# Patient Record
Sex: MALE | Race: WHITE | Employment: UNEMPLOYED | URBAN - METROPOLITAN AREA
[De-identification: names, ages, dates, MRNs, and addresses within clinical notes are randomized per-mention and may not be internally consistent; named-entity substitution may affect disease eponyms.]

---

## 2019-12-24 ENCOUNTER — HOSPITAL ENCOUNTER (EMERGENCY)
Age: 19
Discharge: HOME | End: 2019-12-24
Attending: EMERGENCY MEDICINE
Payer: COMMERCIAL

## 2019-12-24 VITALS
DIASTOLIC BLOOD PRESSURE: 76 MMHG | TEMPERATURE: 98.8 F | HEIGHT: 64 IN | SYSTOLIC BLOOD PRESSURE: 136 MMHG | RESPIRATION RATE: 20 BRPM | OXYGEN SATURATION: 100 % | HEART RATE: 76 BPM

## 2019-12-24 DIAGNOSIS — Z72.89 SELF-MUTILATION: ICD-10-CM

## 2019-12-24 DIAGNOSIS — S51.812A LACERATION OF LEFT FOREARM, INITIAL ENCOUNTER: Primary | ICD-10-CM

## 2019-12-24 PROCEDURE — 77030018836 HC SOL IRR NACL ICUM -A

## 2019-12-24 PROCEDURE — 90471 IMMUNIZATION ADMIN: CPT

## 2019-12-24 PROCEDURE — 74011250636 HC RX REV CODE- 250/636: Performed by: PHYSICIAN ASSISTANT

## 2019-12-24 PROCEDURE — 75810000293 HC SIMP/SUPERF WND  RPR

## 2019-12-24 PROCEDURE — 99282 EMERGENCY DEPT VISIT SF MDM: CPT

## 2019-12-24 PROCEDURE — 77030002986 HC SUT PROL J&J -A

## 2019-12-24 PROCEDURE — 90715 TDAP VACCINE 7 YRS/> IM: CPT | Performed by: PHYSICIAN ASSISTANT

## 2019-12-24 RX ORDER — FLUOXETINE HYDROCHLORIDE 20 MG/1
60 CAPSULE ORAL DAILY
Status: ON HOLD | COMMUNITY
End: 2020-01-31 | Stop reason: SDUPTHER

## 2019-12-24 RX ORDER — METHYLPHENIDATE HYDROCHLORIDE 36 MG/1
36 TABLET ORAL
COMMUNITY
End: 2020-01-31

## 2019-12-24 RX ORDER — ARIPIPRAZOLE 15 MG/1
15 TABLET ORAL DAILY
COMMUNITY
End: 2020-01-31

## 2019-12-24 RX ADMIN — TETANUS TOXOID, REDUCED DIPHTHERIA TOXOID AND ACELLULAR PERTUSSIS VACCINE, ADSORBED 0.5 ML: 5; 2.5; 8; 8; 2.5 SUSPENSION INTRAMUSCULAR at 14:03

## 2019-12-24 NOTE — DISCHARGE INSTRUCTIONS
Stitches out in 7 to 10 days  Good wound care   Plan bacitracin or Neosporin to wound for the next 2 to 3 days  Watch for infection  Tylenol or ibuprofen for pain  Contact counselors and psychiatrist of injury     Cuts: Care Instructions  Your Care Instructions  A cut can happen anywhere on your body. Stitches, staples, skin adhesives, or pieces of tape called Steri-Strips are sometimes used to keep the edges of a cut together and help it heal. Steri-Strips can be used by themselves or with stitches or staples. Sometimes cuts are left open. If the cut went deep and through the skin, the doctor may have closed the cut in two layers. A deeper layer of stitches brings the deep part of the cut together. These stitches will dissolve and don't need to be removed. The upper layer closure, which could be stitches, staples, Steri-Strips, or adhesive, is what you see on the cut. A cut is often covered by a bandage. The doctor has checked you carefully, but problems can develop later. If you notice any problems or new symptoms, get medical treatment right away. Follow-up care is a key part of your treatment and safety. Be sure to make and go to all appointments, and call your doctor if you are having problems. It's also a good idea to know your test results and keep a list of the medicines you take. How can you care for yourself at home? If a cut is open or closed  · Prop up the sore area on a pillow anytime you sit or lie down during the next 3 days. Try to keep it above the level of your heart. This will help reduce swelling. · Keep the cut dry for the first 24 to 48 hours. After this, you can shower if your doctor okays it. Pat the cut dry. · Don't soak the cut, such as in a bathtub. Your doctor will tell you when it's safe to get the cut wet. · After the first 24 to 48 hours, clean the cut with soap and water 2 times a day unless your doctor gives you different instructions.   ? Don't use hydrogen peroxide or alcohol, which can slow healing. ? You may cover the cut with a thin layer of petroleum jelly and a nonstick bandage. ? If the doctor put a bandage over the cut, put on a new bandage after cleaning the cut or if the bandage gets wet or dirty. · Avoid any activity that could cause your cut to reopen. · Be safe with medicines. Read and follow all instructions on the label. ? If the doctor gave you a prescription medicine for pain, take it as prescribed. ? If you are not taking a prescription pain medicine, ask your doctor if you can take an over-the-counter medicine. If the cut is closed with stitches, staples, or Steri-Strips  · Follow the above instructions for open or closed cuts. · Do not remove the stitches or staples on your own. Your doctor will tell you when to come back to have the stitches or staples removed. · Leave Steri-Strips on until they fall off. If the cut is closed with a skin adhesive  · Follow the above instructions for open or closed cuts. · Leave the skin adhesive on your skin until it falls off on its own. This may take 5 to 10 days. · Do not scratch, rub, or pick at the adhesive. · Do not put the sticky part of a bandage directly on the adhesive. · Do not put any kind of ointment, cream, or lotion over the area. This can make the adhesive fall off too soon. Do not use hydrogen peroxide or alcohol, which can slow healing. When should you call for help? Call your doctor now or seek immediate medical care if:    · You have new pain, or your pain gets worse.     · The skin near the cut is cold or pale or changes color.     · You have tingling, weakness, or numbness near the cut.     · The cut starts to bleed, and blood soaks through the bandage. Oozing small amounts of blood is normal.     · You have trouble moving the area near the cut.     · You have symptoms of infection, such as:  ? Increased pain, swelling, warmth, or redness around the cut.  ?  Red streaks leading from the cut.  ? Pus draining from the cut.  ? A fever.    Watch closely for changes in your health, and be sure to contact your doctor if:    · The cut reopens.     · You do not get better as expected. Where can you learn more? Go to http://mary-loni.info/. Enter M735 in the search box to learn more about \"Cuts: Care Instructions. \"  Current as of: June 26, 2019  Content Version: 12.2  © 6772-5745 [employer]. Care instructions adapted under license by [employer] (which disclaims liability or warranty for this information). If you have questions about a medical condition or this instruction, always ask your healthcare professional. [address] any warranty or liability for your use of this information.

## 2019-12-24 NOTE — ED TRIAGE NOTES
Patient with complaints of a laceration to the left wrist. Per mom, patient scratched himself with objects when he is stressed.  Patient denies trying to hurt himself

## 2019-12-24 NOTE — ED PROVIDER NOTES
EMERGENCY DEPARTMENT HISTORY AND PHYSICAL EXAM    Date: 12/24/2019  Patient Name: Cecilia Lombardi    History of Presenting Illness       Chief Complaint   Patient presents with    Laceration       History Provided By: Patient and Patient's Mother    Additional History (Context): Cecilia Lombardi is a 20 y.o. male with a known history of traumatic brain injury, fetal alcohol syndrome, behavioral issues with known history of self-mutilation presents the emergency room with his mother with complaints of laceration to the back of his left forearm. Patient was \"scratching himself\" earlier today. However did not realize that he actually sustained a laceration. Finally was able to reveal this to his mother who immediately brought him to the hospital to be evaluated. Bleeding under controlled. He denies any pain. Mother states that he is actively under the care of psychiatry and counseling. She is not worried about his overall health as he does this from time to time when he is stressed out. PCP: Andreas Meyer MD    Current Outpatient Medications   Medication Sig Dispense Refill    FLUoxetine (PROZAC) 20 mg capsule Take 60 mg by mouth daily.  methylphenidate ER 36 mg 24 hr tab Take 36 mg by mouth every morning.  ARIPiprazole (ABILIFY) 15 mg tablet Take 15 mg by mouth daily. Past History     Past Medical History:  Past Medical History:   Diagnosis Date    Fetal alcohol syndrome     TBI (traumatic brain injury) ([facility] [address]        Past Surgical History:  Past Surgical History:   Procedure Laterality Date    HX HERNIA REPAIR         Family History:  History reviewed. No pertinent family history. Social History:  Social History     Tobacco Use    Smoking status: Never Smoker    Smokeless tobacco: Never Used   Substance Use Topics    Alcohol use: Not on file    Drug use: Not on file       Allergies:  No Known Allergies      Review of Systems   Review of Systems   Musculoskeletal: Negative. Skin: Positive for wound. All other systems reviewed and are negative. Physical Exam     Vitals:    12/24/19 1252   BP: 136/76   Pulse: 76   Resp: 20   Temp: 98.8 °F (37.1 °C)   SpO2: 100%   Height: 5' 4\" (1.626 m)     Physical Exam  Vitals signs and nursing note reviewed. Constitutional:       Appearance: Normal appearance. He is well-developed and well-groomed. Cardiovascular:      Rate and Rhythm: Normal rate and regular rhythm. Heart sounds: Normal heart sounds. Pulmonary:      Effort: Pulmonary effort is normal.      Breath sounds: Normal breath sounds. Musculoskeletal:      Left forearm: He exhibits laceration. He exhibits no tenderness and no swelling. Comments: Linear 2.5 cm laceration to the dorsal aspect of the left forearm down approaching the wrist.  Full-thickness laceration. Some bleeding but controlled by pressure. No soft tissue swelling. Wound extends into the subcutaneous layers. Able to visualize wound bed. There are also superficial abrasions noted to the dorsal and volar aspect of the forearm as well. Skin:     General: Skin is warm and dry. Capillary Refill: Capillary refill takes less than 2 seconds. Neurological:      Mental Status: He is alert and oriented to person, place, and time. Psychiatric:         Mood and Affect: Mood normal.         Behavior: Behavior normal. Behavior is cooperative. Nursing note and vitals reviewed         Diagnostic Study Results     Labs -   No results found for this or any previous visit (from the past 12 hour(s)). Radiologic Studies   No orders to display     CT Results  (Last 48 hours)    None        CXR Results  (Last 48 hours)    None            Medical Decision Making   I am the first provider for this patient. I reviewed the vital signs, available nursing notes, past medical history, past surgical history, family history and social history.     Vital Signs-Reviewed the patient's vital signs. Records Reviewed: Nursing Notes    Provider Notes:   20 y.o. male   See procedure note    Procedures:  Wound Closure by Adhesive  Date/Time: 12/24/2019 2:09 PM  Performed by: ELDA Faria  Authorized by: ELDA Faria     Consent:     Consent obtained:  Verbal    Consent given by:  Patient and parent    Risks discussed:  Infection and pain  Anesthesia (see MAR for exact dosages): Anesthesia method:  Local infiltration    Local anesthetic:  Lidocaine 1% w/o epi  Laceration details:     Location:  Shoulder/arm    Shoulder/arm location:  L lower arm    Length (cm):  2.5    Depth (mm):  3  Repair type:     Repair type:  Simple  Pre-procedure details:     Preparation:  Patient was prepped and draped in usual sterile fashion  Exploration:     Wound exploration: wound explored through full range of motion and entire depth of wound probed and visualized      Wound extent: no fascia violation noted, no muscle damage noted, no nerve damage noted and no tendon damage noted      Contaminated: no    Treatment:     Area cleansed with:  Betadine and saline    Amount of cleaning:  Standard    Irrigation solution:  Sterile saline    Irrigation volume:  50 cc    Irrigation method:  Syringe    Visualized foreign bodies/material removed: no    Skin repair:     Repair method:  Sutures    Suture size:  5-0    Suture material:  Prolene    Suture technique:  Vertical mattress    Number of sutures:  3  Approximation:     Approximation:  Close  Post-procedure details:     Dressing:  Antibiotic ointment, non-adherent dressing and sterile dressing    Patient tolerance of procedure: Tolerated well, no immediate complications      Procedure, long talk with patient and his mother again regarding his issues with self-mutilation. Again, mother is very competent about taking him home and talking to a psychiatrist as well as his therapist regarding this issue.   Family already looking around the house just to make sure there is nothing else that patient can get a hold of that may cause injury. Felt comfortable letting this patient go home with his parents. Also was able to meet his father prior to discharge. ED Course:   Initial assessment performed. The patients presenting problems have been discussed, and they are in agreement with the care plan formulated and outlined with them. I have encouraged them to ask questions as they arise throughout their visit. Diagnosis and Disposition       DISCHARGE NOTE:  Williams Coleman  results have been reviewed with him. He has been counseled regarding his diagnosis, treatment, and plan. He verbally conveys understanding and agreement of the signs, symptoms, diagnosis, treatment and prognosis and additionally agrees to follow up as discussed. He also agrees with the care-plan and conveys that all of his questions have been answered. I have also provided discharge instructions for him that include: educational information regarding their diagnosis and treatment, and list of reasons why they would want to return to the ED prior to their follow-up appointment, should his condition change. He has been provided with education for proper emergency department utilization. CLINICAL IMPRESSION:    1. Laceration of left forearm, initial encounter    2. Self-mutilation        PLAN:  1. D/C Home  2. Discharge Medication List as of 12/24/2019  2:12 PM        3. Follow-up Information     Follow up With Specialties Details Why Contact Info    Grayson Guzman MD Pediatrics Call PCP for follow-up and suture removal in 7 to 10 days [phone]      [facility] EMERGENCY DEPT Emergency Medicine  If symptoms worsen, As needed.   May return here in 7 to 10 days for suture removal as well [address] [address]  [phone]        ____________________________________     Please note that this dictation was completed with [employer] computer voice recognition software. Quite often unanticipated grammatical, syntax, homophones, and other interpretive errors are inadvertently transcribed by the computer software. Please disregard these errors. Please excuse any errors that have escaped final proofreading.

## 2020-01-26 ENCOUNTER — APPOINTMENT (OUTPATIENT)
Dept: CT IMAGING | Age: 20
End: 2020-01-26
Attending: PHYSICIAN ASSISTANT
Payer: COMMERCIAL

## 2020-01-26 ENCOUNTER — HOSPITAL ENCOUNTER (EMERGENCY)
Age: 20
Discharge: PSYCHIATRIC OR SUBSTANCE USE FACILITY | End: 2020-01-27
Attending: EMERGENCY MEDICINE
Payer: COMMERCIAL

## 2020-01-26 DIAGNOSIS — T14.91XA SUICIDE ATTEMPT (HCC): Primary | ICD-10-CM

## 2020-01-26 DIAGNOSIS — S10.91XA NECK ABRASION, INITIAL ENCOUNTER: ICD-10-CM

## 2020-01-26 LAB
ALBUMIN SERPL-MCNC: 4.4 G/DL (ref 3.4–5)
ALBUMIN/GLOB SERPL: 1.3 {RATIO} (ref 0.8–1.7)
ALP SERPL-CCNC: 88 U/L (ref 45–117)
ALT SERPL-CCNC: 19 U/L (ref 16–61)
AMORPH CRY URNS QL MICRO: ABNORMAL
AMPHET UR QL SCN: NEGATIVE
ANION GAP SERPL CALC-SCNC: 7 MMOL/L (ref 3–18)
APAP SERPL-MCNC: <2 UG/ML (ref 10–30)
APPEARANCE UR: ABNORMAL
AST SERPL-CCNC: 18 U/L (ref 10–38)
BACTERIA URNS QL MICRO: ABNORMAL /HPF
BARBITURATES UR QL SCN: NEGATIVE
BASOPHILS # BLD: 0 K/UL (ref 0–0.1)
BASOPHILS NFR BLD: 0 % (ref 0–2)
BENZODIAZ UR QL: NEGATIVE
BILIRUB SERPL-MCNC: 0.4 MG/DL (ref 0.2–1)
BILIRUB UR QL: NEGATIVE
BUN SERPL-MCNC: 14 MG/DL (ref 7–18)
BUN/CREAT SERPL: 15 (ref 12–20)
CALCIUM SERPL-MCNC: 8.9 MG/DL (ref 8.5–10.1)
CANNABINOIDS UR QL SCN: NEGATIVE
CHLORIDE SERPL-SCNC: 108 MMOL/L (ref 100–111)
CO2 SERPL-SCNC: 28 MMOL/L (ref 21–32)
COCAINE UR QL SCN: NEGATIVE
COLOR UR: YELLOW
CREAT SERPL-MCNC: 0.94 MG/DL (ref 0.6–1.3)
DIFFERENTIAL METHOD BLD: ABNORMAL
EOSINOPHIL # BLD: 0 K/UL (ref 0–0.4)
EOSINOPHIL NFR BLD: 0 % (ref 0–5)
EPITH CASTS URNS QL MICRO: ABNORMAL /LPF (ref 0–5)
ERYTHROCYTE [DISTWIDTH] IN BLOOD BY AUTOMATED COUNT: 12.3 % (ref 11.6–14.5)
ETHANOL SERPL-MCNC: <3 MG/DL (ref 0–3)
GLOBULIN SER CALC-MCNC: 3.5 G/DL (ref 2–4)
GLUCOSE SERPL-MCNC: 95 MG/DL (ref 74–99)
GLUCOSE UR STRIP.AUTO-MCNC: NEGATIVE MG/DL
HCT VFR BLD AUTO: 43.8 % (ref 36–48)
HDSCOM,HDSCOM: NORMAL
HGB BLD-MCNC: 15 G/DL (ref 13–16)
HGB UR QL STRIP: ABNORMAL
KETONES UR QL STRIP.AUTO: NEGATIVE MG/DL
LEUKOCYTE ESTERASE UR QL STRIP.AUTO: NEGATIVE
LYMPHOCYTES # BLD: 1.4 K/UL (ref 0.9–3.6)
LYMPHOCYTES NFR BLD: 24 % (ref 21–52)
MCH RBC QN AUTO: 32.1 PG (ref 24–34)
MCHC RBC AUTO-ENTMCNC: 34.2 G/DL (ref 31–37)
MCV RBC AUTO: 93.6 FL (ref 74–97)
METHADONE UR QL: NEGATIVE
MONOCYTES # BLD: 0.5 K/UL (ref 0.05–1.2)
MONOCYTES NFR BLD: 8 % (ref 3–10)
NEUTS SEG # BLD: 4 K/UL (ref 1.8–8)
NEUTS SEG NFR BLD: 68 % (ref 40–73)
NITRITE UR QL STRIP.AUTO: NEGATIVE
OPIATES UR QL: NEGATIVE
PCP UR QL: NEGATIVE
PH UR STRIP: 7.5 [PH] (ref 5–8)
PLATELET # BLD AUTO: 208 K/UL (ref 135–420)
PMV BLD AUTO: 9.4 FL (ref 9.2–11.8)
POTASSIUM SERPL-SCNC: 3.6 MMOL/L (ref 3.5–5.5)
PROT SERPL-MCNC: 7.9 G/DL (ref 6.4–8.2)
PROT UR STRIP-MCNC: NEGATIVE MG/DL
RBC # BLD AUTO: 4.68 M/UL (ref 4.7–5.5)
RBC #/AREA URNS HPF: ABNORMAL /HPF (ref 0–5)
SODIUM SERPL-SCNC: 143 MMOL/L (ref 136–145)
SP GR UR REFRACTOMETRY: 1.02 (ref 1–1.03)
UROBILINOGEN UR QL STRIP.AUTO: 0.2 EU/DL (ref 0.2–1)
WBC # BLD AUTO: 5.9 K/UL (ref 4.6–13.2)
WBC URNS QL MICRO: ABNORMAL /HPF (ref 0–5)

## 2020-01-26 PROCEDURE — 99285 EMERGENCY DEPT VISIT HI MDM: CPT

## 2020-01-26 PROCEDURE — 80053 COMPREHEN METABOLIC PANEL: CPT

## 2020-01-26 PROCEDURE — 72125 CT NECK SPINE W/O DYE: CPT

## 2020-01-26 PROCEDURE — 70450 CT HEAD/BRAIN W/O DYE: CPT

## 2020-01-26 PROCEDURE — 81001 URINALYSIS AUTO W/SCOPE: CPT

## 2020-01-26 PROCEDURE — 85025 COMPLETE CBC W/AUTO DIFF WBC: CPT

## 2020-01-26 PROCEDURE — 80307 DRUG TEST PRSMV CHEM ANLYZR: CPT

## 2020-01-27 ENCOUNTER — HOSPITAL ENCOUNTER (INPATIENT)
Age: 20
LOS: 4 days | Discharge: HOME | DRG: 885 | End: 2020-01-31
Attending: PSYCHIATRY & NEUROLOGY | Admitting: PSYCHIATRY & NEUROLOGY
Payer: COMMERCIAL

## 2020-01-27 VITALS
RESPIRATION RATE: 18 BRPM | HEIGHT: 66 IN | TEMPERATURE: 98 F | OXYGEN SATURATION: 100 % | SYSTOLIC BLOOD PRESSURE: 106 MMHG | WEIGHT: 181 LBS | DIASTOLIC BLOOD PRESSURE: 62 MMHG | HEART RATE: 63 BPM | BODY MASS INDEX: 29.09 KG/M2

## 2020-01-27 DIAGNOSIS — F90.1 ADHD, HYPERACTIVE-IMPULSIVE TYPE: Primary | Chronic | ICD-10-CM

## 2020-01-27 PROBLEM — T14.91XA SUICIDE ATTEMPT (HCC): Status: ACTIVE | Noted: 2020-01-27

## 2020-01-27 PROBLEM — S10.91XA: Status: ACTIVE | Noted: 2020-01-27

## 2020-01-27 PROCEDURE — 65220000003 HC RM SEMIPRIVATE PSYCH

## 2020-01-27 PROCEDURE — 74011250637 HC RX REV CODE- 250/637: Performed by: EMERGENCY MEDICINE

## 2020-01-27 RX ORDER — TRAZODONE HYDROCHLORIDE 50 MG/1
50 TABLET ORAL
Status: DISCONTINUED | OUTPATIENT
Start: 2020-01-27 | End: 2020-01-31 | Stop reason: HOSPADM

## 2020-01-27 RX ORDER — IBUPROFEN 600 MG/1
600 TABLET ORAL
Status: DISCONTINUED | OUTPATIENT
Start: 2020-01-27 | End: 2020-01-31 | Stop reason: HOSPADM

## 2020-01-27 RX ORDER — HALOPERIDOL 5 MG/1
5 TABLET ORAL
Status: DISCONTINUED | OUTPATIENT
Start: 2020-01-27 | End: 2020-01-31 | Stop reason: HOSPADM

## 2020-01-27 RX ORDER — METHYLPHENIDATE HYDROCHLORIDE 10 MG/1
35 TABLET ORAL
Status: COMPLETED | OUTPATIENT
Start: 2020-01-27 | End: 2020-01-27

## 2020-01-27 RX ORDER — HALOPERIDOL 5 MG/ML
5 INJECTION INTRAMUSCULAR
Status: DISCONTINUED | OUTPATIENT
Start: 2020-01-27 | End: 2020-01-31 | Stop reason: HOSPADM

## 2020-01-27 RX ORDER — HYDROXYZINE PAMOATE 50 MG/1
50 CAPSULE ORAL
Status: DISCONTINUED | OUTPATIENT
Start: 2020-01-27 | End: 2020-01-31 | Stop reason: HOSPADM

## 2020-01-27 RX ORDER — FLUOXETINE HYDROCHLORIDE 20 MG/1
60 CAPSULE ORAL
Status: COMPLETED | OUTPATIENT
Start: 2020-01-27 | End: 2020-01-27

## 2020-01-27 RX ADMIN — METHYLPHENIDATE HYDROCHLORIDE 35 MG: 10 TABLET ORAL at 12:33

## 2020-01-27 RX ADMIN — FLUOXETINE 60 MG: 20 CAPSULE ORAL at 12:32

## 2020-01-27 RX ADMIN — ARIPIPRAZOLE 15 MG: 10 TABLET ORAL at 12:32

## 2020-01-27 NOTE — PROGRESS NOTES
contacted by ED for voluntary inpt psych placement. If at any time Patient becomes involuntary- ED will need to contact Police for a paperless ECO (Emergency Custody Order) who will then call [facility] directly to assist with TDO as needed.  will contact all facilities and they will contact ED directly for questions or acceptances. CM does not need to be notified by staff once bed confirmed to ED by facility. Once all facilities have been contacted should a bed not be available through today. CM will resume search in the morning and continue to work toward transition of care. CM contacted and provided MRN  to [facility] - they are at capacity at this time    CM contacted and provided MRN  To [facility] for review - they have been provided ED number and requested to contact ED directly if bed is available and they can accept. If patient is still in ED in the morning, CM to continue searching for voluntary bed placement.

## 2020-01-27 NOTE — ED PROVIDER NOTES
EMERGENCY DEPARTMENT HISTORY AND PHYSICAL EXAM    Date: 1/26/2020  Patient Name: Kuldeep Ortega    History of Presenting Illness     Chief Complaint   Patient presents with    Suicidal         History Provided By: Patient    9:26 PM  Kuldeep Ortega is a 20 y.o. male with PMHX of fetal alcohol syndrome, TBI who presents to the emergency department C/O mild posterior neck pain status post attempted suicide. Just under 1 hour prior to arrival, patient attempted to hang himself by tying tubes open cords to his pull-up bar. When he tied the cords he dropped down and at some point from the time he did that to hitting the ground he lost consciousness for unknown time. Does not think it was for very long as his father found him awake on the floor and they called 911. Presents with [facility] who contacted [facility]. History of self-mutilation which was a possible suicide attempt. Patient states he does not want to kill himself now as the attempt scared him. Pt denies auditory hallucinations, headache, vision changes, voice changes, anterior neck pain, chest pain, shortness of breath, extremity numbness or weakness, and any other sxs or complaints. PCP: Karina Eden MD    Current Outpatient Medications   Medication Sig Dispense Refill    ARIPiprazole (ABILIFY) 15 mg tablet Take 15 mg by mouth daily.  FLUoxetine (PROZAC) 20 mg capsule Take 60 mg by mouth daily.  methylphenidate ER 36 mg 24 hr tab Take 36 mg by mouth every morning. Past History     Past Medical History:  Past Medical History:   Diagnosis Date    Fetal alcohol syndrome     Non-nicotine vapor product user     TBI (traumatic brain injury) ([facility] [address]        Past Surgical History:  Past Surgical History:   Procedure Laterality Date    HX HERNIA REPAIR         Family History:  History reviewed. No pertinent family history.     Social History:  Social History     Tobacco Use    Smoking status: Never Smoker    Smokeless tobacco: Never Used   Substance Use Topics    Alcohol use: Not Currently    Drug use: Not Currently       Allergies:  No Known Allergies      Review of Systems   Review of Systems   Respiratory: Negative for shortness of breath. Cardiovascular: Negative for chest pain. Musculoskeletal: Positive for neck pain. Neurological: Positive for syncope. Negative for weakness, numbness and headaches. Psychiatric/Behavioral: Positive for self-injury and suicidal ideas. All other systems reviewed and are negative. Physical Exam     Vitals:    01/26/20 2027 01/27/20 0552   BP: 141/86 106/62   Pulse: 65 63   Resp: 18 18   Temp: 97.8 °F (36.6 °C) 98 °F (36.7 °C)   SpO2: 100% 100%   Weight: 82.1 kg (181 lb)    Height: 5' 6\" (1.676 m)      Physical Exam    Vital signs and nursing notes reviewed. CONSTITUTIONAL: Alert. Well-appearing; well-nourished; in no apparent distress. HEAD: Normocephalic; atraumatic. EYES: PERRL; EOM's intact. No nystagmus. Conjunctiva clear. ENT: TM's normal. External ear normal. Normal nose; no rhinorrhea. Normal pharynx. Moist mucus membranes. NECK: Supple; FROM without difficulty, horizontal linear erythema/very superficial abrasions that are circumferential around his neck. Mild tenderness right cervical paraspinal muscle just lateral to C5-C6 without any point midline tenderness or step-off. Trachea midline and nontender. Voice is normal  CV: Normal S1, S2; no murmurs, rubs, or gallops. No chest wall tenderness. RESPIRATORY: Normal chest excursion with respiration; breath sounds clear and equal bilaterally; no wheezes, rhonchi, or rales. GI: Normal bowel sounds; non-distended; non-tender; no guarding or rigidity; no palpable organomegaly. No CVA tenderness. BACK:  No evidence of trauma or deformity. Non-tender to palpation. FROM without difficulty. EXT: Normal ROM in all four extremities; non-tender to palpation.  No edema or calf tenderness  SKIN: Normal for age and race; warm; dry; good turgor; no apparent lesions or exudate. NEURO: A & O x3. Cranial nerves 2-12 intact. Motor 5/5 bilaterally. Sensation intact. PSYCH:  Mood and affect appropriate. Diagnostic Study Results     Labs -     No results found for this or any previous visit (from the past 12 hour(s)). Radiologic Studies -   CT HEAD WO CONT   Final Result   IMPRESSION:      No acute intracranial abnormalities. CT SPINE CERV WO CONT   Final Result   IMPRESSION:      No acute fractures or listhesis. Minimal broad-based posterior disc bulge at C4-C5. CT Results  (Last 48 hours)               01/26/20 2200  CT HEAD WO CONT Final result    Impression:  IMPRESSION:       No acute intracranial abnormalities. Narrative:  EXAM: CT head       INDICATION: Possible loss of consciousness, attempted hanging       COMPARISON: None. TECHNIQUE: Axial CT imaging of the head was performed without intravenous   contrast. One or more dose reduction techniques were used on this CT: automated   exposure control, adjustment of the mAs and/or kVp according to patient size,   and iterative reconstruction techniques. The specific techniques used on this   CT exam have been documented in the patient's electronic medical record. Digital   Imaging and Communications in Medicine (DICOM) format image data are available   to nonaffiliated external healthcare facilities or entities on a secure, media   free, reciprocally searchable basis with patient authorization for at least a   12-month period after this study. _______________       FINDINGS:       BRAIN AND POSTERIOR FOSSA: The sulci, folia, ventricles and basal cisterns are   within normal limits for the patient's age. There is no intracranial hemorrhage,   mass effect, or midline shift. There are no areas of abnormal parenchymal   attenuation. EXTRA-AXIAL SPACES AND MENINGES: There are no abnormal extra-axial fluid   collections.        CALVARIUM: Intact. SINUSES: Clear. OTHER: None.       _______________           01/26/20 2200  CT SPINE CERV WO CONT Final result    Impression:  IMPRESSION:       No acute fractures or listhesis. Minimal broad-based posterior disc bulge at C4-C5. Narrative:  EXAM: CT cervical spine       INDICATION: Attempted hanging       COMPARISON: None. TECHNIQUE: Axial CT imaging of the cervical spine was performed from the skull   base to the thoracic inlet without intravenous contrast. Multiplanar reformats   were generated. One or more dose reduction techniques were used on this CT:   automated exposure control, adjustment of the mAs and/or kVp according to   patient size, and iterative reconstruction techniques. The specific techniques   used on this CT exam have been documented in the patient's electronic medical   record. Digital Imaging and Communications in Medicine (DICOM) format image data   are available to nonaffiliated external healthcare facilities or entities on a   secure, media free, reciprocally searchable basis with patient authorization for   at least a 12-month period after this study. _______________       FINDINGS:       VERTEBRAE AND DISCS: Vertebral alignment and articulation are within normal   limits. Vertebral body and disc space heights are maintained. Specifically, no   compression deformities are noted and there is no spondylolisthesis. No   displaced fractures are identified. There are no significant areas of bone   lucency or sclerosis. SPINAL CANAL AND FORAMINA: Minimal focal broad-based posterior disc bulge seen   at C4-C5. PREVERTEBRAL SOFT TISSUES: Normal.       VISIBLE PORTIONS OF POSTERIOR FOSSA/BRAIN: Normal.       LUNG APICES: Clear.        OTHER: None.       _______________               CXR Results  (Last 48 hours)    None          Medications given in the ED-  Medications   ARIPiprazole (ABILIFY) tablet 15 mg (15 mg Oral Given 1/27/20 1232) FLUoxetine (PROzac) capsule 60 mg (60 mg Oral Given 1/27/20 1232)   methylphenidate HCl (RITALIN) tablet 35 mg (35 mg Oral Given 1/27/20 1233)         Medical Decision Making   I am the first provider for this patient. I reviewed the vital signs, available nursing notes, past medical history, past surgical history, family history and social history. Vital Signs-Reviewed the patient's vital signs. Pulse Oximetry Analysis - 100% on RA     Records Reviewed: Nursing Notes      Procedures:  Procedures    ED Course:  9:26 PM   Initial assessment performed. The patients presenting problems have been discussed, and they are in agreement with the care plan formulated and outlined with them. I have encouraged them to ask questions as they arise throughout their visit. 10:49 PM progress note  Patient CT head and neck showed nothing acute and labs unremarkable. Patient is medically cleared. Will consult  to assist with placement at psychiatric facility as pt is voluntary. 10:58 PM Consult Note  Case discussed with  Fede Currie who states she will reach out to psych facilities and we will await to hear back. 1:40 AM Progress Note. ED attending Dr. Florinda Kevin will assume care. Awaiting psychiatric placement. 1/27/2020 1230PM  Patient given home dose of his morning medications including his Ritalin, Prozac and Abilify    Diagnosis and Disposition     CONSULT NOTE:   1:09 PM  Gonzales Hutson DO spoke with Dr. Ginette Oh   Specialty: psych  Discussed pt's hx, disposition, and available diagnostic and imaging results. Reviewed care plans. Consulting physician agrees with plans as outlined. .     TRANSFER PROGRESS NOTE:    1:09 PM  Discussed impending transfer with Patient and/or family. Pt and/or family instructed that Pt would be transferred to [facility]. Discussed reasoning for transfer and future treatment plan. Family and Pt understands and agrees with care plan.     Labs Reviewed   CBC WITH AUTOMATED DIFF - Abnormal; Notable for the following components:       Result Value    RBC 4.68 (*)     All other components within normal limits   ACETAMINOPHEN - Abnormal; Notable for the following components:    Acetaminophen level <2 (*)     All other components within normal limits   URINALYSIS W/ RFLX MICROSCOPIC - Abnormal; Notable for the following components:    Blood TRACE (*)     All other components within normal limits   URINE MICROSCOPIC ONLY - Abnormal; Notable for the following components:    Bacteria 1+ (*)     Amorphous Crystals 1+ (*)     All other components within normal limits   METABOLIC PANEL, COMPREHENSIVE   DRUG SCREEN, URINE   ETHYL ALCOHOL       Recent Results (from the past 24 hour(s))   CBC WITH AUTOMATED DIFF    Collection Time: 01/26/20  8:45 PM   Result Value Ref Range    WBC 5.9 4.6 - 13.2 K/uL    RBC 4.68 (L) 4.70 - 5.50 M/uL    HGB 15.0 13.0 - 16.0 g/dL    HCT 43.8 36.0 - 48.0 %    MCV 93.6 74.0 - 97.0 FL    MCH 32.1 24.0 - 34.0 PG    MCHC 34.2 31.0 - 37.0 g/dL    RDW 12.3 11.6 - 14.5 %    PLATELET 042 775 - 754 K/uL    MPV 9.4 9.2 - 11.8 FL    NEUTROPHILS 68 40 - 73 %    LYMPHOCYTES 24 21 - 52 %    MONOCYTES 8 3 - 10 %    EOSINOPHILS 0 0 - 5 %    BASOPHILS 0 0 - 2 %    ABS. NEUTROPHILS 4.0 1.8 - 8.0 K/UL    ABS. LYMPHOCYTES 1.4 0.9 - 3.6 K/UL    ABS. MONOCYTES 0.5 0.05 - 1.2 K/UL    ABS. EOSINOPHILS 0.0 0.0 - 0.4 K/UL    ABS.  BASOPHILS 0.0 0.0 - 0.1 K/UL    DF AUTOMATED     METABOLIC PANEL, COMPREHENSIVE    Collection Time: 01/26/20  8:45 PM   Result Value Ref Range    Sodium 143 136 - 145 mmol/L    Potassium 3.6 3.5 - 5.5 mmol/L    Chloride 108 100 - 111 mmol/L    CO2 28 21 - 32 mmol/L    Anion gap 7 3.0 - 18 mmol/L    Glucose 95 74 - 99 mg/dL    BUN 14 7.0 - 18 MG/DL    Creatinine 0.94 0.6 - 1.3 MG/DL    BUN/Creatinine ratio 15 12 - 20      GFR est AA >60 >60 ml/min/1.73m2    GFR est non-AA >60 >60 ml/min/1.73m2    Calcium 8.9 8.5 - 10.1 MG/DL    Bilirubin, total 0.4 0.2 - 1.0 MG/DL    ALT (SGPT) 19 16 - 61 U/L    AST (SGOT) 18 10 - 38 U/L    Alk. phosphatase 88 45 - 117 U/L    Protein, total 7.9 6.4 - 8.2 g/dL    Albumin 4.4 3.4 - 5.0 g/dL    Globulin 3.5 2.0 - 4.0 g/dL    A-G Ratio 1.3 0.8 - 1.7     ACETAMINOPHEN    Collection Time: 01/26/20  8:45 PM   Result Value Ref Range    Acetaminophen level <2 (L) 10.0 - 30.0 ug/mL   URINALYSIS W/ RFLX MICROSCOPIC    Collection Time: 01/26/20  8:45 PM   Result Value Ref Range    Color YELLOW      Appearance CLOUDY      Specific gravity 1.020 1.003 - 1.030      pH (UA) 7.5 5.0 - 8.0      Protein NEGATIVE  NEG mg/dL    Glucose NEGATIVE  NEG mg/dL    Ketone NEGATIVE  NEG mg/dL    Bilirubin NEGATIVE  NEG      Blood TRACE (A) NEG      Urobilinogen 0.2 0.2 - 1.0 EU/dL    Nitrites NEGATIVE  NEG      Leukocyte Esterase NEGATIVE  NEG     DRUG SCREEN, URINE    Collection Time: 01/26/20  8:45 PM   Result Value Ref Range    BENZODIAZEPINES NEGATIVE  NEG      BARBITURATES NEGATIVE  NEG      THC (TH-CANNABINOL) NEGATIVE  NEG      OPIATES NEGATIVE  NEG      PCP(PHENCYCLIDINE) NEGATIVE  NEG      COCAINE NEGATIVE  NEG      AMPHETAMINES NEGATIVE  NEG      METHADONE NEGATIVE  NEG      HDSCOM (NOTE)    ETHYL ALCOHOL    Collection Time: 01/26/20  8:45 PM   Result Value Ref Range    ALCOHOL(ETHYL),SERUM <3 0 - 3 MG/DL   URINE MICROSCOPIC ONLY    Collection Time: 01/26/20  8:45 PM   Result Value Ref Range    WBC 0 to 3 0 - 5 /hpf    RBC 0 to 3 0 - 5 /hpf    Epithelial cells FEW 0 - 5 /lpf    Bacteria 1+ (A) NEG /hpf    Amorphous Crystals 1+ (A) NEG       CLINICAL IMPRESSION    1. Suicide attempt ([address]    2. Neck abrasion, initial encounter          Please note that this dictation was completed with [employer], the computer voice recognition software. Quite often unanticipated grammatical, syntax, homophones, and other interpretive errors are inadvertently transcribed by the computer software. Please disregard these errors.   Please excuse any errors that have escaped final proofreading.

## 2020-01-27 NOTE — PROGRESS NOTES
1300- pt accepted at [address] [place] [address] ,pt will be admitted on their Speciality Treatment Unit [facility], accepting physician is Kristan Freeman report can be called to [phone].  contacted by ED for voluntary inpt psych placement. If at any time Patient becomes involuntary- ED will need to contact Police for a paperless ECO (Emergency Custody Order) who will then call [facility] directly to assist with TDO as needed.  will contact all facilities and they will contact ED directly for questions or acceptances. CM does not need to be notified by staff once bed confirmed to ED by facility. Once all facilities have been contacted should a bed not be available through today. CM will resume search in the morning and continue to work toward transition of care.           CM contacted and provided MRN  to [facility], for review- ACCEPTED    CM contacted and provided MRN  To [facility] for review       CM faxed clinical information to [facility] for review    CM faxed clinical information to [facility] for review    CM faxed clinical information to [facility] for review     CM faxed clinical information to [facility]  for review    CM faxed clinical information to [facility]  for review    CM faxed clinical information to [facility] for review     CM faxed clinical information to [facility]  for review    CM faxed clinical information to [facility] for review    CM faxed clinical information to Jose Mcginnis [address] [place] [address], [address] [place] [address], Catrina Dale  for review    CM faxed clinical information to [facility] for review     CM faxed clinical information to [facility]  for review    CM faxed clinical information to [facility]  for review    CM faxed clinical information to  [facility] for review     CM faxed clinical information to [facility]   for review    CM faxed clinical information to [facility] for review     CM faxed clinical information to [facility]  for review    CM faxed clinical information to [facility]  for review    CM faxed clinical information to [facility] for review     CM faxed clinical information to [facility] for review    CM faxed clinical information to [facility] for review    CM faxed clinical information to [facility] for review    CM faxed clinical information to [facility]  for review

## 2020-01-27 NOTE — ED NOTES
Update father at bedside that we are still awaiting placement, concerned about patients daily meds, confirmed meds with pt, MD aware, orders being placed.

## 2020-01-28 PROBLEM — F32.A DEPRESSION: Status: ACTIVE | Noted: 2020-01-28

## 2020-01-28 PROBLEM — Q86.0 FETAL ALCOHOL SPECTRUM DISORDER: Chronic | Status: ACTIVE | Noted: 2020-01-28

## 2020-01-28 PROBLEM — F90.1 ADHD, HYPERACTIVE-IMPULSIVE TYPE: Chronic | Status: ACTIVE | Noted: 2020-01-28

## 2020-01-28 PROBLEM — F31.81 BIPOLAR II DISORDER, SEVERE, DEPRESSED, WITH ANXIOUS DISTRESS (HCC): Chronic | Status: ACTIVE | Noted: 2020-01-28

## 2020-01-28 PROCEDURE — 65220000003 HC RM SEMIPRIVATE PSYCH

## 2020-01-28 PROCEDURE — 74011250637 HC RX REV CODE- 250/637: Performed by: PSYCHIATRY & NEUROLOGY

## 2020-01-28 RX ORDER — ARIPIPRAZOLE 15 MG/1
15 TABLET ORAL
Status: DISCONTINUED | OUTPATIENT
Start: 2020-01-28 | End: 2020-01-31 | Stop reason: HOSPADM

## 2020-01-28 RX ORDER — METHYLPHENIDATE HYDROCHLORIDE 18 MG/1
36 TABLET ORAL DAILY
Status: DISCONTINUED | OUTPATIENT
Start: 2020-01-28 | End: 2020-01-29

## 2020-01-28 RX ORDER — FLUOXETINE HYDROCHLORIDE 20 MG/1
60 CAPSULE ORAL DAILY
Status: DISCONTINUED | OUTPATIENT
Start: 2020-01-28 | End: 2020-01-31 | Stop reason: HOSPADM

## 2020-01-28 RX ADMIN — ARIPIPRAZOLE 15 MG: 15 TABLET ORAL at 20:16

## 2020-01-28 RX ADMIN — METHYLPHENIDATE HYDROCHLORIDE 36 MG: 18 TABLET, EXTENDED RELEASE ORAL at 14:25

## 2020-01-28 RX ADMIN — FLUOXETINE 60 MG: 20 CAPSULE ORAL at 11:04

## 2020-01-28 NOTE — BSMART NOTE
COUNSELING GROUP PROGRESS NOTE PATIENT SCHEDULED FOR GROUP AT: 9:30 
 
ATTENDANCE: Full PARTICIPATION LEVEL: Participates fully in the group process. ATTENTION LEVEL: Able to focus on task. FOCUS: Mindfulness THEMATIC CONTENT AS NOTED IN REFLECTION: He presented with a calm mood and blunted affect and his thought processes were linear and at times limited. He was actively engaged in the group therapy directive and his approach to task was intentional. He was engaged in group discussions and his thematic content was appropriate. He identified \"soccer, bicycle, and taking 100 deep breaths\" as mindfulness activities he would like to incorporate into his daily routine. He was highly focused on soccer and utilized it for all examples in group discussion.

## 2020-01-28 NOTE — BH NOTES
Patient arrived on the escorted by security. Patient was ambulatory alert and orient in all spheres. Patient was cooperative with assessment process. He denies any thoughts to harm himself at this time. Patient stated he was glad when he woke up after attempting to hang himself. Stating that it scared him. Patient says all his parents house rules, became to much for him and \"I blew up\" stated patient. To the reason he attempted suicide. Patient a is monitored every 15 minutes for safety and therapeutic support. RN will initiate, develop implement, review, revise treatment plan.

## 2020-01-28 NOTE — BSMART NOTE
SOCIAL WORK GROUP THERAPY PROGRESS NOTE Group Time:  10am 
 
Group Topic:  Coping Skills Group Participation: Pt was unavailable for group due to meeting with Dr. Doyle English came last x10 minutes & was given handout on Personal Emergency Plan & explained directions how to do during his free time.

## 2020-01-28 NOTE — BH NOTES
During this shift (3pm-11pm) staff has been monitoring this pt 1:1. Pt is observed in the milieu interacting appropriately with both peers and staff. Pt has good intentions supporting other peers on the unit disclosing to them and in turn listening to their reasons for why they are here. Pt states, \"I'm here because I tried to hurt myself really bad but I'm glad I didn't die. I feel like God gave me a second chance. \" Pt continues to disclose, \"I feel like I'm not heard at home unless I do something really bad or extreme. \" Staff told pt to attempt communicating with parents with words instead of negative actions for attention. Pt listened to this writer and appreciated staff listening to what he had to say. Pt says he contracts for safety and denies SI at this time currently. Staff will continue monitoring pt 1:1 for changes in pt behavior, location & safety.
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
## 2020-01-28 NOTE — BSMART NOTE
ART THERAPY GROUP PROGRESS NOTE PATIENT SCHEDULED FOR GROUP AT: 1735 ATTENDANCE: Full PARTICIPATION LEVEL: Participates fully in the art process ATTENTION LEVEL: Able to focus on task FOCUS: Grounding SYMBOLIC & THEMATIC CONTENT AS NOTED IN IMAGERY: He was cheerful, alert, and motivated. He offered encouragement to group members and was invested in the task at hand. He was child-like at times and thought process was concrete. His approach to task was planned-out and purposeful and associations were relevant and appropriate.

## 2020-01-28 NOTE — H&P
History and Physical    Patient: Billie Kimball MRN: [id number]  SSN: [id number]    YOB: 2000  Age: 20 y.o. Sex: male      Subjective:      Billie Kimball is a 20 y.o.  male who admitted experiencing depression and suicidal ideation after  attempting to hang himself. .     Past Medical History:   Diagnosis Date    Fetal alcohol syndrome     Non-nicotine vapor product user     TBI (traumatic brain injury) ([facility] [address]      Past Surgical History:   Procedure Laterality Date    HX HERNIA REPAIR        No family history on file. Social History     Tobacco Use    Smoking status: Never Smoker    Smokeless tobacco: Never Used   Substance Use Topics    Alcohol use: Not Currently      Prior to Admission medications    Medication Sig Start Date End Date Taking? Authorizing Provider   FLUoxetine (PROZAC) 20 mg capsule Take 60 mg by mouth daily. Other, MD Ellen   methylphenidate ER 36 mg 24 hr tab Take 36 mg by mouth every morning. Ellen Mccoy MD   ARIPiprazole (ABILIFY) 15 mg tablet Take 15 mg by mouth daily. OtherEllen MD        No Known Allergies    Review of Systems:  A comprehensive review of systems was negative except for that written in the History of Present Illness. Objective:     Vitals:    01/27/20 1713 01/27/20 2028 01/28/20 0820   BP: 128/77 117/71 112/77   Pulse: 72 68 62   Resp: 18 18 16   Temp: 97.8 °F (36.6 °C) 97.9 °F (36.6 °C) 96.6 °F (35.9 °C)        Physical Exam:  General:  Alert, cooperative, no distress, appears stated age. Eyes:  Conjunctivae/corneas clear. PERRL, EOMs intact. Fundi benign   Ears:  Normal TMs and external ear canals both ears. Nose: Nares normal. Septum midline. Mucosa normal. No drainage or sinus tenderness. Mouth/Throat: Lips, mucosa, and tongue normal. Teeth and gums normal.   Neck: Supple, symmetrical, trachea midline, no adenopathy, thyroid: no enlargment/tenderness/nodules, no carotid bruit and no JVD.    Back:   Symmetric, no curvature. ROM normal. No CVA tenderness. Lungs:   Clear to auscultation bilaterally. Heart:  Regular rate and rhythm, S1, S2 normal, no murmur, click, rub or gallop. Abdomen:   Soft, non-tender. Bowel sounds normal. No masses,  No organomegaly. Extremities: Extremities normal, atraumatic, no cyanosis or edema. Pulses: 2+ and symmetric all extremities. Skin: Skin color, texture, turgor normal. No rashes or lesions   Lymph nodes: Cervical, supraclavicular, and axillary nodes normal.   Neurologic: CNII-XII intact. Normal strength, sensation and reflexes throughout. Assessment:     Hospital Problems  Never Reviewed          Codes Class Noted POA    * (Principal) Bipolar II disorder, severe, depressed, with anxious distress ([facility] [address] (Chronic) ICD-10-CM: F31.81  ICD-9-CM: 296.89  1/28/2020 Yes        ADHD, hyperactive-impulsive type (Chronic) ICD-10-CM: F90.1  ICD-9-CM: 314.01  1/28/2020 Yes        Fetal alcohol spectrum disorder (Chronic) ICD-10-CM: Q86.0  ICD-9-CM: 760.71  1/28/2020 Yes        Suicide attempt [facility]) ICD-10-CM: Y59.74PC  ICD-9-CM: E958.9  1/27/2020 Yes        Neck abrasion, initial encounter ICD-10-CM: Z41.51HL  ICD-9-CM: 910.0  1/27/2020 Yes              Plan:     While inpatient, patient will take medications as scheduled and attend all groups. Out patient, he will follow all physician's orders   Regarding medications and follow-up care.     Signed By: Mike Johns NP     January 28, 2020

## 2020-01-28 NOTE — BH NOTES
DASHA Note: The above pt clothing was changed into paper scrubs at this time. His clothing was placed in his locker.

## 2020-01-28 NOTE — BSMART NOTE
OCCUPATIONAL THERAPY PROGRESS NOTE Group Time:  0199 Attendance: The patient attended full group. Otto Alvarenga Participation: The patient participated fully in the activity. Attention: The patient needed redirection to activity/topic at least once. Interaction: The patient frequently interacts with others. Talkative. Mood euthymic. Responses generally appropriate, possibly truth enhanced at times. and seemed reality based. Said he had a head injury as a child and some brain damage, unclear if accurate.

## 2020-01-28 NOTE — BH NOTES
DASHA Note: The above pt was able to discuss any coping skills and ways to communicate when he is feeling frustrated while in the hospital. He was given different strategies to be able to express himself and cope with any stressors when outside of the hospital. He was receptive to this information during this 1:1 conversation. He was able to vent about his frustration which resulted into him getting admitted into the hospital. He verbalized \"I just felt like my parents were not listening to me and brushing me off and being mean to me\". He continue to speak on his stressors and also his girlfriend that wanted to have a \"sexual relationship\" but I did not want to because she wanted to have an \"\" and I don't believe in that because that is not Zoroastrian like so I broke up with her. He continue to focus on his Pentecostalism beliefs during this conversation. He was educated on effective communication to help him build healthy relationships while out in the community. He was able to focus on his stressors with his siblings at home and how the communication is not effective in the home. He verbalized \" I have seven siblings and I miss all of my siblings\"-\"But I really miss all of my female sisters they really help me when I am home one of my sisters I don't really talk to because she is a doctor and and she performs abortions and that is bad so I really don't talk to her in plus she stays in [place]. \"-\"I would love to go and see her but I don't know\". -Then my other sister she is in [place] right now but I really really misses her she is so short and I try to get her to convince her to have the same beliefs as me but she is not Pentecostalism at all but all I can do is try\". He then begin to focus on his other female sister and verbalized \"I like her to she is easy to talk to and her hair is long and stringy like spaghetti noodles that's what I call her\".  During this conversation he smiled when talking about specific sister during this conversation however he did not disclose why he really misses his specific sister that is currently not in the home at this present time. He appears to have no insight on boundaries  And he was educated on positive and negative boundaries when in the home during this conversation. He appeared receptive of this information however he appear to be focused on attempting to help females peers while in the hospital and he verbalized \" That's why I was put here in the hospital to help people that is what my dad would like for me to do while I am here because I am a Episcopalian and that's what christians do we help people when they are in need\". He was educated on learning how to stay well after discharge. He has verbally contracted for safety and denies any self-harm at this time he also denies any A/V/H at this time. He has not experienced any falls during this shift. His affect has been bright and mood along with voice tone has been bright with good volume during conversation.

## 2020-01-28 NOTE — BSMART NOTE
Late Entry Pt.is a 20year old male with history of Bipolar Disorder Fetal Alcohol Syndrome, TBI and Alcohol use  . Pt. Was hospitalized at this facility for attempting to hand self with IPÓD Cord. Pt.'s case was discussed in treatment team this a.m  
 
CHEO Contact:  SW met with pt to discuss the reason for this admission. Pt. Informed he kept having all of these things in his mind . \" I kept going over and over all the things my parents have been trying to tell me. \"  \" They only have been telling me things for my own good. I think , I understand better now. \" I was listening to my own thoughts , upset and cursing \"  \" I than had the thought to harm kill myself for all the things I have done\". Pt. Gave the examples: cursing  And saying mean things. Pt. Expressed he is glad he came to the hospital. Pt states I feel better now. SW discussed coping skills, safety plan and community supports. Pt  Informed SW he attends a school program and has mental health and in-home skill builders that work with him. SW explained and discussed safety plan with pt to incorporate  Parents, mental health support workers, , suicidal prevention hotline, [facility] services and 1. Pt. Appears sad, has poor impulse control, cooperative and limited judgement and insight at times. CHEO will contact pt.'s family for a collateral.  Pt. Plans to return to his home at [place].

## 2020-01-28 NOTE — GROUP NOTE
CORI  GROUP DOCUMENTATION INDIVIDUAL Group Therapy Note Date: 1/28/2020 Group Start Time: 0830 Group End Time: 0900 Group Topic: Nursing [facility] SPECIAL TRTMT 1 DANNY Del Angel  GROUP DOCUMENTATION GROUP Group Therapy Note Attendees: 6 Attendance: Attended Patient's Goal:  What is anxiety? Interventions/techniques: Informed Follows Directions: Followed directions Interactions: Interacted appropriately Mental Status: Calm Behavior/appearance: Cooperative Goals Achieved: Able to engage in interactions and Able to listen to others Luis Washington RN

## 2020-01-28 NOTE — H&P
[address] [facility] HISTORY AND PHYSICAL    Name:  Adenike Massey  MR#:   [id number]  :  2000  ACCOUNT #:  [de-identified]  ADMIT DATE:  2020    IDENTIFYING DATA:  The patient is a 69-year-old single white male, resident of [place], [place], who lives with his adoptive mother and father. He is covered by [facility] plan. BASIS FOR ADMISSION:  The patient is admitted in referral to us from [facility] where he had presented following a suicide gesture. He had tied a cellphone cord to a pull-up bar and then dropped down onto this, at which point it released and he fell to the ground hitting his head and losing consciousness. When his father came in, he was awake, sitting on the floor, and 911 was contacted. He does have a history of self-mutilation. He had been adopted, coming from [facility] at age 5 as a child with fetal alcohol syndrome. Both mother and father were addicted to drugs and alcohol, and it sounds as though he had been drinking when he was a child also. He is known to have had diagnosis of attention deficit hyperactive disorder and also other psychiatric illness, though he did not know the exact diagnosis. He had been prescribed medicines by Dr. Sydnie Rogel, psychiatrist, until he had recently left practice and the patient was seeing a different doctor. The patient did not know the name of his therapist or the current provider. He did know that he was taking Prozac three pills a day, which sounds to have been 60 mg in the morning, and Abilify one pill at bedtime, which sounds to have been 15 mg at bedtime. He knew he had been taking Concerta which sounds to have been 36 mg daily. He was on no other medications. He reports that from time to time, he would have auditory hallucinations when he was at times of high stress and thinking of suicide.   He also will have auditory hallucinations at times when he drinks alcohol, so he does not drink often and says that he does not wish to drink again. He had not been drinking at the time of this event. He had been dating a girl who wanted him to get her pregnant. He did not want to have a child, and they were having trouble with this. Father had recommended that they split up, which they did just on the day prior to this suicide gesture. He said this was part of the problem with his stress. MEDICAL HISTORY:  He had a healed 1 cm laceration with three sutures on the dorsum of the left forearm from a suicide gesture by cutting himself on 12/24/2019. He also has prior history of superficial scratches to the left arm, but he has not done anything since that event in December. He denied other medical problems. ALLERGIES:  HE DENIED FOOD OR DRUG ALLERGIES. LABORATORY DATA:  Laboratory testing had revealed a normal CBC, concentrated urine with specific gravity 1.020, trace blood, normal comprehensive metabolic panel, negative acetaminophen level, negative alcohol level, and negative urine drug screen. CT scan of the head and spine was done, and the patient was medically cleared by the doctors in the emergency room. SUBSTANCE ABUSE HISTORY:  He says that he would drink alcohol rarely, and when he did, would consume about two-thirds of a bottle of whiskey during which time he would do much worse psychiatrically. He says he knows he should not drink. He denied illegal drug use. He denied tobacco use, but says that he had been vaping CBD oil. He has not done that recently since the law had been passed that those under 21 cannot vape. FAMILY HISTORY AND SOCIAL HISTORY:  As noted above, family history is significant for both mother and father with drug and alcohol abuse. He did not know their psychiatric conditions. He was adopted by [facility] when he was age 5, and he says that they would not approve of his use of drugs or alcohol.   He had been arrested for stealing his father's car when he was younger, though the charges were later dropped when he turned age 24. He denied other legal charges. He said that he graduated high school, but he is currently in an adult vocational education program in which he is taking classes and also auto mechanics course. He wants to work as an . MENTAL STATUS EXAMINATION:  Revealed him to be an alert and oriented white male with a [place] accent. Eye contact was fair. Psychomotor activity was normal.  Speech was fluent. Mood was mildly unhappy to embarrassed with a full affect. Thought processing was logical and goal directed. He denied current hallucinations or delusions, though he endorsed auditory hallucinations and paranoia during times of his drinking or during times of high stress. He said that he does not recall his behaviors during times of high stress in which he will get agitated and sometimes aggressive. Mood was currently mildly unhappy as noted. He was denying current hallucinations or delusions. He denied homicidal or suicidal ideas currently. IQ was estimated in the borderline intellectual function range. Insight and judgment were influenced by his impulsivity and his chronic mental illness. ASSESSMENT:  AXIS I:  Bipolar II disorder, severe, depressed. Alcohol use disorder, mild. AXIS II:  Borderline intellectual function. AXIS III:  Fetal alcohol syndrome. Status post intentional hanging event with episode of unconscious after striking head. TREATMENT PLAN:  This patient is admitted after suicide gesture by hanging event. He apparently did not lose consciousness by hanging, but rather the cord slipped and he struck his head whereupon he briefly lost consciousness. He was medically cleared and not found to have any medical sequelae. He is currently denying homicidal or suicidal ideas and says he feels dramatically better on attention being invested in him.   We will resume him back on his Abilify, Prozac and Concerta. We will try to get information from father in regards to his providers for medication management and therapy and also to confirm the medications. We will continue with individual, group and milieu therapies, art and recreation therapy, case management services, social work services, physical examination. ESTIMATED LENGTH OF STAY:  3-5 days. ANTICIPATED DISPOSITION:  Follow back with his providers in the [facility]. PROGNOSIS:  Fair.       Quynh Plascencia MD      [id number]  D:  01/28/2020 11:10  T:  01/28/2020 15:31  JOB #:  [id number]

## 2020-01-28 NOTE — PROGRESS NOTES
Problem: Suicide  Goal: *STG: Remains safe in hospital  Outcome: Progressing Towards Goal  Goal: *STG/LTG: Complies with medication therapy  Outcome: Progressing Towards Goal    Pt denies SI or hallucinations upon assessment. Pt states, \"The only voices I hear is the voice of God. I am feeling much better today. \" When asked if he has any goals during his hospitalization, pt states \"I want to help people while I am in the hospital. I want to help people find Umair.\" Pt is calm and cooperative, talking and laughing with staff and other patients. Pt is medication compliant and attentive in groups. Will continue to monitor.

## 2020-01-28 NOTE — GROUP NOTE
CORI  GROUP DOCUMENTATION INDIVIDUAL Group Therapy Note Date: 1/27/2020 Group Start Time: 2030 Group End Time: 2045 Group Topic: Nursing [facility] SPECIAL TRT 1 Crew, [facility] Group Therapy Note Attendees:11 Attendance: Attended Patient's Goal: Interventions/techniques: Informed Follows Directions: Followed directions Interactions: Interacted appropriately Mental Status: Calm Behavior/appearance: Attentive Goals Achieved: Able to listen to others Additional Notes:   
 
 
Chase Tyson

## 2020-01-29 PROCEDURE — 65220000003 HC RM SEMIPRIVATE PSYCH

## 2020-01-29 PROCEDURE — 74011250637 HC RX REV CODE- 250/637: Performed by: PSYCHIATRY & NEUROLOGY

## 2020-01-29 RX ORDER — METHYLPHENIDATE HYDROCHLORIDE 18 MG/1
72 TABLET ORAL DAILY
Status: DISCONTINUED | OUTPATIENT
Start: 2020-01-30 | End: 2020-01-31 | Stop reason: HOSPADM

## 2020-01-29 RX ADMIN — FLUOXETINE 60 MG: 20 CAPSULE ORAL at 08:12

## 2020-01-29 RX ADMIN — IBUPROFEN 600 MG: 600 TABLET ORAL at 15:59

## 2020-01-29 RX ADMIN — METHYLPHENIDATE HYDROCHLORIDE 36 MG: 18 TABLET, EXTENDED RELEASE ORAL at 08:12

## 2020-01-29 RX ADMIN — ARIPIPRAZOLE 15 MG: 15 TABLET ORAL at 20:19

## 2020-01-29 NOTE — BSMART NOTE
Pt.is a 20year old male with history of Bipolar Disorder Fetal Alcohol Syndrome, TBI and Alcohol use  . Pt. Was hospitalized at this facility for attempting to hand self with IPÓD Cord. Pt.'s case was discussed in treatment team this a.m . CHEO Contact:  CHEO  Met with pt. To discuss dc planning. \" I feel better emotionally\". \" Just anxious about going home\". CHEO assisted pt with positive coping strategies and safety plan. Pt. States he will follow  house rules and not run away when he is upset. Pt. States he will communicate with \" the parents, mental health staff and fiends when he is not feeling safe\". Pt. denies ideations and hallucinations  Pt. Appears cooperative, mood has improved and little insight. CHEO will continue to assist the pt with dc planning. CHEO was ask to scheduled a family session CHEO Collateral:  CHEO talked to pt.'s father @ [phone]. The pt.'s parents are planning to come for a FS on 1/30/20 @ 10:00a.m  . The family is exploring additional community services for the pt. The father will informed CHEO hensley the details at session.

## 2020-01-29 NOTE — BSMART NOTE
COUNSELING GROUP PROGRESS NOTE PATIENT SCHEDULED FOR GROUP AT: 10:00 
 
ATTENDANCE: Full PARTICIPATION LEVEL: Participates fully in the group process. ATTENTION LEVEL: Needs occasional re-direction. FOCUS: Mindfulness THEMATIC CONTENT AS NOTED IN REFLECTION: He presented with a content mood and broad affect and his thought processes were limited and had a flighty quality. He was actively engaged in the group therapy directive and his approach to task was purposeful. He displayed difficulty in understanding the directive and required redirection to focus on the task at hand. He often became distracted by other patients and environmental noise on the milleu and needed redirection to remain focused. He stated that he wants to go home and identified happy and nervous as feelings he has related to going home. He stated that prior to hospitalization he was \"bad, like stealing and hurting myself\" and that now he plans to \"be good and obey my parents. \" He described family dynamics of being close with his older brother siblings and feeling dysphoric due to missing his parents.

## 2020-01-29 NOTE — PROGRESS NOTES
Problem: Suicide  Goal: *STG: Remains safe in hospital  Outcome: Progressing Towards Goal  Note:   Patient will remain safe in the hospital daily  Goal: *STG: Attends activities and groups  Outcome: Progressing Towards Goal  Note:   Patient will attend scheduled activities and groups daily  Goal: *STG/LTG: Complies with medication therapy  Outcome: Progressing Towards Goal  Note:   Patient will comply with medication therapy daily     Patient has been visible and active in the day area during this shift. He has been medication adherent, and has also attended groups today. Interactive with staff and peers. Patient is not having current thoughts of suicide. He states he feels better, and no longer wants to hurt himself. Will continue to monitor for safety and support.

## 2020-01-29 NOTE — GROUP NOTE
CORI  GROUP DOCUMENTATION INDIVIDUAL Group Therapy Note Date: 1/29/2020 Group Start Time: 1258 Group End Time: 9782 Group Topic: Nursing [facility] SPECIAL TRTMT 1 Lindsay Riojas, RN 
 
CORI  GROUP DOCUMENTATION GROUP Group Therapy Note Attendees: 4 Attendance: Attended Patient's Goal:  Medication management Interventions/techniques: Challenged and Informed Follows Directions: Followed directions Interactions: Interacted appropriately Mental Status: Calm Behavior/appearance: Attentive and Cooperative Goals Achieved: Able to engage in interactions, Able to listen to others and Able to receive feedback Additional Notes:   
 
Dread Rueda RN

## 2020-01-29 NOTE — BSMART NOTE
OCCUPATIONAL THERAPY PROGRESS NOTE Group Time:  2114 Attendance: The patient attended full group. Smith Junk Participation: The patient participated fully in the activity. Attention: The patient was able to focus on the activity. Interaction: The patient frequently interacts with others. Talkative, some intrusiveness related to being helpful to peers although the help was of an appropriate nature. Mood bright. Pleasant and cooperative.

## 2020-01-29 NOTE — BSMART NOTE
ART THERAPY GROUP PROGRESS NOTE PATIENT SCHEDULED FOR GROUP AT: 3632 ATTENDANCE: Full PARTICIPATION LEVEL: Participates fully in the art process ATTENTION LEVEL: Able to focus on task FOCUS: Identify emotions SYMBOLIC & THEMATIC CONTENT AS NOTED IN IMAGERY: He presented with a euthymic mood and was cheerful with a bright affect. When this writer approached the unit the television had been requested to be on \"Ge[employer],\" a channel meant for toddlers to age 6 by patient. He is child-like in his interaction with others and associations. He was invested in the task at hand and offered encouragement to group members. He actively participated in group directive and discussion. He claimed there is [place] lot of drama around him in the apartment\" and claimed that he either has to \"fog people out to ignore or physically remove [him]self\" in order to to feed into or be affected by the \"drama. \"
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
## 2020-01-29 NOTE — PROGRESS NOTES
conducted an Spirituality Group for Charles Carver, who is a 20 y.o.,male. Patient's Primary Language is: [place]. According to the patient's EMR Jehovah's witness Affiliation is: [place]. The reason the Patient came to the hospital is:   Patient Active Problem List    Diagnosis Date Noted    [facility], severe, depressed, [facility] 01/28/2020    ADHD, hyperactive-impulsive type 01/28/2020    Fetal alcohol spectrum disorder 01/28/2020    Suicide attempt ([facility] 01/27/2020    Neck abrasion, initial encounter 01/27/2020         conducted Spirituality Group \"Worries and Blessings\" and the patient was one of the participants. The  provided the following Interventions:  Continued the relationship of care and support. Listened empathically. Offered prayer and assurance of continued prayer on patients behalf. Chart reviewed. The following outcomes were achieved:  Patient expressed gratitude for 's visit.  w  Assessment:  There are no further spiritual or Sabianism issues which require Spiritual Care Services interventions at this time. Plan:  Chaplains will continue to follow and will provide pastoral care on an as needed/requested basis.  recommends bedside caregivers page  on duty if patient shows signs of acute spiritual or emotional distress. Chaplain Louis HARKINS  [address] [facility]   [phone]

## 2020-01-29 NOTE — GROUP NOTE
CORI  GROUP DOCUMENTATION INDIVIDUAL Group Therapy Note Date: 1/28/2020 Group Start Time: 26 Group End Time: 1930 Group Topic: Nursing [facility] SPECIAL TRTMT 1 Shayy Ashley RN 
 
[facility] GROUP DOCUMENTATION GROUP Group Therapy Note Attendees: 6 Attendance: Attended Interventions/techniques: Informed Follows Directions: Followed directions Interactions: Interacted appropriately Mental Status: Calm Behavior/appearance: Cooperative Goals Achieved: Able to engage in interactions, Able to listen to others and Able to self-disclose Dk Morgan RN

## 2020-01-29 NOTE — BH NOTES
During visitation, patient was very intrusive of another patient's family visit. Patient has been redirected several times for intrusive behavior by staff. Patient has been trying to lure the other patient down to his room, but staff intercepted and patient was asked not to do such an act again. Will continue to monitor the patient's safety and location.

## 2020-01-29 NOTE — BH NOTES
Treatment team met -     Medical Director:                                __x___present        Psychiatrist:                                        _x____present      Charge nurse:                                     _x____present           MSW:                                                _x____present      :                      _x____present      Nurse Manager:                                  _____present      Student RNs:                                      _____present      Medical Students:                               __x___present      Art Therapist:                                      __x___present   Clinical Coordinator:                           __x___present   Internal :                      __x___present        Plan of care discussed and updated as appropriate. Art therapy recommended.

## 2020-01-30 PROCEDURE — 74011250637 HC RX REV CODE- 250/637: Performed by: PSYCHIATRY & NEUROLOGY

## 2020-01-30 PROCEDURE — 65220000003 HC RM SEMIPRIVATE PSYCH

## 2020-01-30 RX ADMIN — FLUOXETINE 60 MG: 20 CAPSULE ORAL at 08:16

## 2020-01-30 RX ADMIN — METHYLPHENIDATE HYDROCHLORIDE 72 MG: 18 TABLET, EXTENDED RELEASE ORAL at 10:24

## 2020-01-30 RX ADMIN — ARIPIPRAZOLE 15 MG: 15 TABLET ORAL at 20:29
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
## 2020-01-30 NOTE — GROUP NOTE
CORI  GROUP DOCUMENTATION INDIVIDUAL Group Therapy Note Date: 1/30/2020 Group Start Time: 6697 Group End Time: 0631 Group Topic: [facility] SPECIAL TRT 1 Lindsay Riojas, RN 
 
IP  GROUP DOCUMENTATION GROUP Group Therapy Note Attendees: 2 Attendance: Attended Patient's Goal:  Coping and reflection Interventions/techniques: Challenged and Informed Follows Directions: Followed directions Interactions: Interacted appropriately Mental Status: Calm Behavior/appearance: Cooperative Goals Achieved: Able to engage in interactions, Able to listen to others, Able to manage/cope with feelings and Able to receive feedback Additional Notes:   
 
Rose Vasquez RN
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
## 2020-01-30 NOTE — BSMART NOTE
OCCUPATIONAL THERAPY PROGRESS NOTE Group Time:  1716 Attendance: The patient attended 1/3 of group. The patient left and returned to activity at least once. Participation: The patient participated fully in the activity. When in group. .. Interaction: The patient frequently interacts with others. Left group, after initial activity, without warning and to apparently take a shower. Some attention seeking confabulation in responses in grandiose manner, on occasion. Mood bright.

## 2020-01-30 NOTE — PROGRESS NOTES
Behavioral Health Progress Note    Admit Date: 1/27/2020  Hospital day 3    Vitals : 24-Hr Vitals/Weight (last day)     Date/Time Temp Pulse BP MAP (Calculated) BP 1 Location BP Patient Position Resp SpO2 O2 Device O2 Flow Rate (L/min) Level of Consciousness MEWS Score Weight   01/30/20 0755 97.8 °F (36.6 °C) 67 115/78 90   16    Alert 1    01/29/20 2037 97.1 °F (36.2 °C) 66 118/73 88 Right arm Sitting 18    Alert 1    01/29/20 0752 97.3 °F (36.3 °C) 73 130/82 98 Right arm Sitting 14    Alert 0    Labs:  No results found for this or any previous visit (from the past 24 hour(s)).   Meds:   Current Facility-Administered Medications   Medication Dose Route Frequency    methylphenidate HCl (CONCERTA) 18 mg tablet 72 mg  72 mg Oral DAILY    FLUoxetine (PROzac) capsule 60 mg  60 mg Oral DAILY    ARIPiprazole (ABILIFY) tablet 15 mg  15 mg Oral QHS    haloperidol lactate (HALDOL) injection 5 mg  5 mg IntraMUSCular Q6H PRN    haloperidoL (HALDOL) tablet 5 mg  5 mg Oral Q6H PRN    traZODone (DESYREL) tablet 50 mg  50 mg Oral QHS PRN    hydrOXYzine pamoate (VISTARIL) capsule 50 mg  50 mg Oral Q4H PRN    ibuprofen (MOTRIN) tablet 600 mg  600 mg Oral Q6H PRN      Hospital Problems: Principal Problem:    Bipolar II disorder, severe, depressed, with anxious distress ([facility] (1/28/2020)    Active Problems:    [facility])      Neck abrasion, initial encounter (1/27/2020)      ADHD, hyperactive-impulsive type (1/28/2020)      Fetal alcohol spectrum disorder (1/28/2020)        Subjective:   Medication side effects: none  none    Mental Status Exam  Sensorium: alert  Orientation: oriented to time, place, person and situation  Relations: cooperative  Eye Contact: appropriate  Appearance: shows no evidence of impairment  Thought Process: normal rate of thoughts and poor abstract reasoning/computation   Thought Content: no evidence of impairment   Suicidal: denies   Homicidal: none     Mood: is euthymic   Affect: stable  Memory: shows no evidence of impairment     Concentration: fair  Abstraction: concrete  Insight: The patient's insight shows no evidence of impairment    OR Fair  Judgement: is psychologically impaired OR  Fair    Assessment/Plan:   not changed    The patient had family session today with social work and his mother and father. The mother said that he had done better using Intuniv and wanted him to restart this. They said he had been on 10 mg though we do not have this available here at the hospital and he can go on it when he goes home. They do have plans that he will stay with parents. His  are in process of trying to find him a group home. He has an appointment with Dr. Rohan Diehl on January 31 at [facility]. He also follows up with the [employer] and the [facility] program.  He denies homicidal or suicidal ideas. He is denying hallucinations. He does wish to be discharged to home tomorrow and have outpatient follow-up. He does not meet criteria to keep him in the hospital further beyond this.   Continue close observation,

## 2020-01-30 NOTE — BSMART NOTE
COUNSELING GROUP PROGRESS NOTE Group time: 9:30 The patient did not awaken/get up when called for group, he was initially in the day area upon arrival and he went to his room and did not come to day area for duration of group.

## 2020-01-30 NOTE — BSMART NOTE
ART THERAPY GROUP PROGRESS NOTE PATIENT SCHEDULED FOR GROUP AT: 15 
 
ATTENDANCE: Full PARTICIPATION LEVEL: Participates fully in the art process. ATTENTION LEVEL: Able to focus on task. FOCUS: Safe Place/Space SYMBOLIC & THEMATIC CONTENT AS NOTED IN IMAGERY: Kavita Villalobos introduced himself as the [place], here to teach people about God. \" He was very upbeat and happy in his mood and affect the entire session. He was almost hyper and didn't follow the directives instead choosing images and things he likes, to include in his work. Kavita Villalobos had a hard time organizing his image and interacted fondly with another group member.

## 2020-01-30 NOTE — BH NOTES
The writer has observed the patient's behavior throughout this shift [id number]). During this shift the patient has been very intrusive and disrespectful. Patient was redirected several times and was again redirected during visitation, intruding on another patient's family visit. The other patient's father was upset for the intrusive behavior that he displayed. Patient was asked to be remove, verbally by staff. Patient removed himself from the area and went into his room. Patient reappeared back to the day area and sat down and watch television and talking. In addition, patient stated to staff \" I like to be in control and I like to tell people what to do\". After patient said that statement, he began to get agitated,and staff asked patient to go to his room to relax. Patient went into his room and rested. At this time patient is resting and has been in compliance with scheduled medications. Staff will continue to monitor the patient's safety and locations.

## 2020-01-30 NOTE — BSMART NOTE
SOCIAL WORK GROUP THERAPY PROGRESS NOTE Group Time:  10am 
 
Group Topic:  Coping Skills Group Participation:  
 
Pt expressed not interested in attending session despite staff support, he continued to stay in bed & remained non verbal.

## 2020-01-31 VITALS
SYSTOLIC BLOOD PRESSURE: 118 MMHG | RESPIRATION RATE: 18 BRPM | DIASTOLIC BLOOD PRESSURE: 75 MMHG | HEART RATE: 74 BPM | TEMPERATURE: 97.7 F

## 2020-01-31 PROCEDURE — 74011250637 HC RX REV CODE- 250/637: Performed by: PSYCHIATRY & NEUROLOGY

## 2020-01-31 RX ORDER — FLUOXETINE HYDROCHLORIDE 20 MG/1
60 CAPSULE ORAL DAILY
Qty: 45 CAP | Refills: 1 | Status: SHIPPED | OUTPATIENT
Start: 2020-01-31

## 2020-01-31 RX ORDER — ARIPIPRAZOLE 15 MG/1
15 TABLET ORAL
Qty: 15 TAB | Refills: 1 | Status: SHIPPED | OUTPATIENT
Start: 2020-01-31

## 2020-01-31 RX ORDER — METHYLPHENIDATE HYDROCHLORIDE 18 MG/1
TABLET ORAL
Qty: 1 TAB | Refills: 0 | Status: SHIPPED
Start: 2020-02-01

## 2020-01-31 RX ADMIN — METHYLPHENIDATE HYDROCHLORIDE 72 MG: 18 TABLET, EXTENDED RELEASE ORAL at 08:03

## 2020-01-31 RX ADMIN — FLUOXETINE 60 MG: 20 CAPSULE ORAL at 08:00

## 2020-01-31 NOTE — BSMART NOTE
SOCIAL WORK GROUP THERAPY PROGRESS NOTE Group Time:  10:15am    
 
Group Topic:  Coping Skills    C D Issues Group Participation:   
 
Pt moderately involved during group discussion and remained attentive. Needed some redirection when hyper verbal. 
 
Discussion included the process of making \"Change\" by answering questions on handout with an emphasis on strengths & weaknesses to support improving one's self esteem. Most responses had themes of Mormonism attached to them, which limited his ability to realistically look at his cognitive distortions. Timothy Ludwig

## 2020-01-31 NOTE — BSMART NOTE
Pt.is a 20year old male with history of Bipolar Disorder Fetal Alcohol Syndrome, TBI and Alcohol use  . Pt. Was hospitalized at this facility for attempting to hand self with IPÓD Cord. Pt.'s case was discussed in treatment team . Pt will be dc today. CHEO contacted pt.'s mother and made her aware of pt.'s dc. The pt.'s mother plans to pick pt up @ 1:00 pm . CHEO informed the unit nurse. DC Plan:  CHEO discussed dc with pt. Willi Stiles had pt to reflect on safety plan and coping strategies. CHEO encouraged pt to communicate with supports. Pt. Denies ideations and hallucinations. Pt. Has an appointment scheduled with Dr. Homero Henderson on 1/31/20 @ [facility]   [facility], [address] [place] [address] Phone: [phone]. CHEO left a message with Mrs. Brooklyn Manrique pt.'s CM with Sandra @ [phone].

## 2020-01-31 NOTE — BH NOTES
Reviewed discharge paperwork with patient, answered all questions, and removed armband. Pt stable and in no distress. Accompanied patient downstairs for discharge to mother.

## 2020-01-31 NOTE — GROUP NOTE
CORI  GROUP DOCUMENTATION INDIVIDUAL Group Therapy Note Date: 1/30/2020 Group Start Time: 2000 Group End Time: 2015 Group Topic: Nursing [facility] SPECIAL TRTMT 1 Alyssia Aviles CORI  GROUP DOCUMENTATION GROUP Group Therapy Note Attendees: 6 Attendance: Attended Patient's Goal:  Be Present in the Moment Interventions/techniques: Challenged and Informed Follows Directions: Followed directions Interactions: Interacted appropriately Mental Status: Calm Behavior/appearance: Enthusiastic Goals Achieved: Able to engage in interactions and Able to give feedback to another Ike Allen

## 2020-01-31 NOTE — DISCHARGE INSTRUCTIONS
BEHAVIORAL HEALTH NURSING DISCHARGE NOTE      The following personal items collected during your admission are returned to you:   Dental Appliance: Dental Appliances: None  Vision: Visual Aid: None  Hearing Aid:    Jewelry: Jewelry: None  Clothing:    Other Valuables:    Valuables sent to safe: Personal Items Sent to Safe: None      PATIENT INSTRUCTIONS:    Patient armband removed and shredded    The discharge information has been reviewed with the patient. The patient verbalized understanding.     Phone numbers to remember:  [address] [place] [address] Desk:  [address] [place] [address] Emergency Services: [phone]   Suicide Prevention: [phone]

## 2020-01-31 NOTE — BH NOTES
The writer has observed the patient's behavior throughout this shift ([id number]). During this shift the patient has been engaging with peers, but continues to be intrusive with other patients' conversations. Patient was redirected during visitation for his continuance of his intrusive behavior towards another patient's family members. Staff will continue to monitor the patient's safety and locations.

## 2020-02-01 NOTE — DISCHARGE SUMMARY
[facility] DISCHARGE    Name:  Rosana Moritz  MR#:   [id number]  :  2000  ACCOUNT #:  [de-identified]  ADMIT DATE:  2020  DISCHARGE DATE:  2020    IDENTIFYING DATA:  The patient had presented as a 79-year-old single white male, resident of [place], [place], who is living with adoptive mother and father, who was admitted in referral from [facility] where he presented following a suicide gesture by tying a cell phone cord to a pull-up bar and dropping down. The cord let go and he fell to the ground hitting his head, losing consciousness. He was taken to the emergency room and medically cleared. He has a history of self-mutilation. He had been adopted from [facility] at age 5 and described as a child with fetal alcohol syndrome. He had been diagnosed with a variety of things including attention deficit hyperactive disorder, obsessive-compulsive disorder, bipolar disorder. He previously was prescribed medications by Dr. Irvin Callaway, psychiatrist, and he now has a new therapist and provider. He knew that he had been taking Prozac three pills a day, Abilify one pill at bedtime, Concerta unknown dosing. He said at times he had auditory hallucinations when he was highly stressed and would think of suicide. He had been dating a girl who wanted him to get her pregnant and he did not wish to have a child, so they were having relationship problems and subsequently they split up which was stressful to the patient. He did have a healed 1 cm laceration with three sutures on the dorsum of the left arm from a suicide gesture by cutting himself on 2019. He also has a prior history of superficial scratches to the left arm, but says he has not cut himself since December. Laboratory testing revealed a normal CBC, concentrated urine with specific gravity 1.020, trace of blood.   He had normal comprehensive metabolic panel, negative acetaminophen level, negative alcohol level, negative urine drug screen. A CT scan of the head and spine was done, and the patient was medically cleared by the physicians in the emergency room. HOSPITAL COURSE:  The patient was admitted voluntarily to the [facility] special treatment unit. Physical examination was done by Ana Cheung, nurse practitioner, and he did have a neck abrasion as only significant medical complaint. Vital signs were normal.  In the hospital, the patient was treated with his Abilify 15 mg daily, fluoxetine 60 mg daily, single dosing ibuprofen 600 mg for pain, Concerta 36 mg daily until mother contacted and told us that he was on 72 mg daily so he received this on his last two hospital days. Mother did say that he had been on the medication of Intuniv and he can restart this when he gets out of the hospital.  The patient was very quickly denying auditory hallucinations, homicidal or suicidal ideas and said that he wanted to be discharged home with outpatient followup. He was not responding to internal stimuli and was not aggressive or agitated. He did have a family session and he was seeing outpatient  who was working on his placement in a group home with young men at a later time when he was out of the hospital.  He was involved in a variety of programs including an adult education for vocation and in-home workers. CONDITION ON DISCHARGE:  Fair. PROGNOSIS:  Fair. ASSESSMENT:  AXIS I:  Bipolar II disorder, depressed, severe without psychosis. Alcohol use disorder, mild. AXIS II:  Borderline intellectual function. AXIS III:  Fetal alcohol syndrome. Status post intentional hanging event with episode of unconscious after striking head. Well-healed 1 cm laceration, left forearm, recent. DISPOSITION/FOLLOWUP PLANS:  Discharged to self. Follow up with the therapist and prescriber in [facility]. Appointment to be set up by .   Follow up with own primary care provider as needed. Follow up with in-home workers. MEDICATIONS:  Concerta 72 mg daily; fluoxetine 20 mg capsules three daily, #45, one refill; aripiprazole 15 mg tablet one at bedtime, #15, one refill.       Annette Bradshaw MD      [id number]  D:  01/31/2020 10:02  T:  02/01/2020 0:27  JOB #:  [id number]

## 2022-10-28 ENCOUNTER — TRANSCRIBE ORDER (OUTPATIENT)
Dept: SCHEDULING | Age: 22
End: 2022-10-28

## 2022-10-28 DIAGNOSIS — R56.9 GENERALIZED-ONSET SEIZURES (HCC): Primary | ICD-10-CM

## 2022-11-17 ENCOUNTER — HOSPITAL ENCOUNTER (OUTPATIENT)
Dept: MRI IMAGING | Age: 22
Discharge: HOME | End: 2022-11-17
Attending: PSYCHIATRY & NEUROLOGY
Payer: COMMERCIAL

## 2022-11-17 DIAGNOSIS — R56.9 GENERALIZED-ONSET SEIZURES (HCC): ICD-10-CM

## 2022-11-17 PROCEDURE — 70551 MRI BRAIN STEM W/O DYE: CPT

## 2023-12-15 NOTE — ED NOTES
Student's Name:   Constantin Costa Birth Date  2007  Sex  male  Race/Ethnicity   School/Grade Level/ID#     Address:   [address]  [facility] [address]  Parent/Guardian             Telephone#                                            Home:                               Work:   IMMUNIZATIONS: To be completed by health care provider. The mo/da/yr for every dose administered is required. If a specific vaccine is medically contraindicated, a separate written statement must be attached by the health care responsible for completing the health examination explaining the medical reason for the contraindication.      Immunization History   Administered Date(s) Administered   • DTaP/IPV 08/12/2011, 08/12/2011   • HPV 9-Valent 08/29/2017, 05/09/2019   • Influenza, intranasal, quadrivalent, live (FLUMIST) 11/10/2011   • Influenza, quadrivalent, multi-dose 11/04/2015   • Influenza, seasonal, injectable, preservative free 11/04/2015   • Influenza, seasonal, injectable, trivalent 11/10/2011, 10/09/2014   • Measles Mumps Rubella Varicella 08/12/2011, 08/12/2011   • Meningococcal Conjugate MCV4O (Menveo) 10/06/2022   • Meningococcal Conjugate MCV4P (Menactra) 08/29/2017   • Meningococcal Group B OMV 2 Dose(Bexsero) 10/06/2022   • Tdap 08/29/2017   Pended Date(s) Pended   • Meningococcal Group B OMV 2 Dose(Bexsero) 12/15/2023      Immunizations given 12/15/2023: The vaccines marked as \"pended\" above were administered today.      Health care provider (DO LISA, APN, PA, school health professional, health official) verifying above immunization history must sign below. If adding dates to the above immunization history section, put your initials by date(s) and sign here.)  Signature                                                                 Title                                                Date  ____      , DO       12/15/23____________________________________  Signature                                                 Verbal shift change report given to [employer] (oncoming nurse) by Jina Huston RN (offgoing nurse). Report included the following information SBAR, ED Summary and Procedure Summary.                  Title                                                Date  _____________________________________________________________________________________   ALTERNATIVE PROOF OF IMMUNITY   1. Clinical diagnosis (measles, mumps, hepatitis B) is allowed when verified by physician and supported with lab confirmation.  Attach copy of lab result.    * MEASLES (Rubeola)  MO   DA  YR          **MUMPS  MO   DA  YR             HEPATITIS B  MO   DA   YR           VARICELLA  MO   DA  YR      2. History of varicella (chickenpox) disease is acceptable if verified by health care provider, school health professional or health official.  Person signing below verifies that the parent/guardian’s description of varicella disease history is indicative of past infection and is accepting such history as documentation of disease.  Date of Disease                                  Signature                                                           Title   3. Laboratory Evidence of Immunity (check one)      __ Measles*         __ Mumps**        __ Rubella        __Varicella    (Attach copy of lab result)         *All measles cases diagnosed on or after July 1, 2002, must be confirmed by laboratory evidence.      **All mumps cases diagnosed on or after July 1, 2013, must be confirmed by laboratory evidence.     Completion of Alternative 1 or 3 MUST be accompanied by Labs & Physician Signature: ___________________________  Physician Statements of Immunity MUST be submitted to [facility] for review.     Certificates of Confucianist Exemption to Immunizations or Physician Medical Statements of Medical Contraindication Are Reviewed   and Maintained by the School Authority     (11/2015)                                         (COMPLETE BOTH SIDES)                                  Approved [facility] 3/2016      HEALTH HISTORY      TO BE COMPLETED AND SIGNED BY PARENT/GUARDIAN AND VERIFIED BY HEALTH CARE PROVIDER  ALLERGIES: (Food, drug, insect, other)  has No Known Allergies.   MEDICATION: (List all prescribed or taken on a regular basis.) has a current medication list which includes the following prescription(s): fluticasone (FLONASE) 50 MCG/ACT nasal spray, olopatadine (PATADAY) 0.2 % ophthalmic solution, benzoyl peroxide 5 % gel, Acetaminophen (TYLENOL CHILDRENS PO), Ibuprofen (CHILDRENS MOTRIN PO), and GuaiFENesin (MUCINEX CHILDRENS PO).   Diagnosis of asthma?  Child wakes during night coughing? Yes    No  Yes    No  Loss of function of one of paired  organs?(eye/ear/kidney/testicle) Yes    No    Birth defects? Yes    No  Hospitalizations? Yes    No    Developmental delay? Yes    No   When? What for? Yes    No    Blood disorders? Hemophilia,  Sickle Cell, Other? Explain. Yes    No  Surgery? (List all.)  When? What for? Yes    No    Diabetes? Yes    No  Serious injury or illness? Yes    No    Head injury/Concussion/Passed out? Yes    No  TB skin test positive (past/present)? * Yes    No *[facility]   Seizures? What are they like?  Yes    No  TB disease (past or present)? * Yes    No *If yes, [facility]   Heart problem/Shortness of breath?  Yes    No  Tobacco use (type, frequency)?  Yes    No    Heart murmur/High blood pressure? Yes    No  Alcohol/Drug use?  Yes    No    Dizziness or chest pain with exercise? Yes    No  Family history of sudden death  before age 50? (Cause?) Yes    No    Eye/Vision problems? ______           __Glasses          __Contacts  Last exam by eye doctor ______  Other concerns? (crossed eye, drooping lids, squinting, difficulty reading) Dental?   __ Braces     __ Bridge     __ Plate   __Other   Ear/Hearing problems? Yes    No  Information may be shared with appropriate personnel for health and educational purposes.   Bone/Joint problem/injury/scoliosis? Yes    No  Parent/Guardian  Signature                                               Date   PHYSICAL EXAMINATION REQUIREMENTS   Entire section below  to be completed by MD//APN/PA Head Circumference if <2-3 years old - /74   Pulse 64   Temp 97.2 °F (36.2 °C) (Temporal)   Ht 6' 0.44\" (1.84 m)   Wt (!) 112.5 kg (247 lb 14.5 oz)   BMI 33.21 kg/m²   BSA 2.34 m²    98 %ile (Z= 1.97) based on CDC (Boys, 2-20 Years) BMI-for-age based on BMI available as of 12/15/2023.   DIABETES SCREENING (NOT REQUIRED FOR ) BMI>85% age/sex: Yes     And any two of the following: Family History: No  Ethnic Minority: No    Signs of Insulin Resistance (hypertension, dyslipidemia, polycystic ovarian syndrome, acanthosis nigricans): No  At Risk: No   LEAD RISK QUESTIONNAIRE Required for children age 6 months through 6 years enrolled in licensed or public school operated day care, , nursery school and/or . (Blood test required if resides in [place] or high risk zip code.)  Questionnaire Administered? No  Blood Test Indicated? No   Blood Test Date/Result:    TB SKIN OR BLOOD TEST Recommended only for children in high-risk groups including children immunosuppressed due to HIV infection or other conditions, frequent travel to or born in high prevalence countries or those exposed to adults in high-risk categories. See CDC guidelines. http://www.cdc.gov/tb/publications/factsheets/testing/TB_testing.htm.  Test Needed: No     Test performed: No                          Skin Test:    Date Read              /      /             Result:   Positive__      Negative__        mm________                          Blood Test: Date Reported       /      /               Result:  Positive__      Negative__      Value________  LAB TESTS (recommended) Date/Result  Date/Results   Hemoglobin or Hematocrit 15.6 (1/30/2023) Sickle Cell (when indicated)    Urinalysis NA Developmental Screening Tool Normal - ASQ        SYSTEM REVIEW Normal  Comments/Follow-up/Needs  Normal Comments/Follow-up/Needs   Skin  Yes  Endocrine Yes    Ears Yes                  Screening Result  Gastrointestinal Yes    Eyes Yes                  Screening Result: Genito-Urinary Yes                                      LMP:    Nose Yes  Neurologic Yes    Throat Yes  Musculoskeletal Yes    Mouth/Dental Yes  Spinal Exam Yes    Cardiovascular/HTN Yes  Nutritional status Yes    Respiratory Yes Diagnosis of Asthma: No   Mental Health Yes    Currently Prescribed Asthma Medication:        No  Quick-relief medication (e.g. Short Acting Beta Antagonist)        No  Controller medication (e.g. inhaled corticosteroid) Other     NEEDS/MODIFICATIONS required in the school setting: No restrictions DIETARY Needs/Restrictions: No restrictions   SPECIAL INSTRUCTIONS/DEVICES e.g. safety glasses, glass eye, chest protector for arrhythmia, pacemaker, prosthetic device, dental bridge, false teeth, athletic support/cup: No restrictions   MENTAL HEALTH/OTHER Is there anything else the school should know about this student?  If you would like to discuss this student’s health with school or school health personnel:  Not needed   EMERGENCY ACTION needed while at school due to child’s health condition (e.g. ,seizures, asthma, insect sting, food, peanut allergy, bleeding problem, diabetes, heart problem)?   No   On the basis of the examination on this day, I approve this child’s participation in                                (If No or Modified please attach explanation.)  PHYSICAL EDUCATION:  Yes                               INTERSCHOLASTIC SPORTS   Yes   Print Name  Miguelito Taylor          Signature                                            Date: 12/15/2023   Address:  [facility] [place] [id number] [facility]  [facility] [place] [id number] [address] [phone]         (Complete Both Sides)     Approved [facility] 3/2016